# Patient Record
Sex: FEMALE | Race: WHITE | Employment: OTHER | URBAN - METROPOLITAN AREA
[De-identification: names, ages, dates, MRNs, and addresses within clinical notes are randomized per-mention and may not be internally consistent; named-entity substitution may affect disease eponyms.]

---

## 2020-08-07 ENCOUNTER — TRANSFERRED RECORDS (OUTPATIENT)
Dept: HEALTH INFORMATION MANAGEMENT | Facility: CLINIC | Age: 65
End: 2020-08-07

## 2020-08-27 ENCOUNTER — TRANSFERRED RECORDS (OUTPATIENT)
Dept: HEALTH INFORMATION MANAGEMENT | Facility: CLINIC | Age: 65
End: 2020-08-27

## 2021-02-22 ENCOUNTER — TRANSCRIBE ORDERS (OUTPATIENT)
Dept: OTHER | Age: 66
End: 2021-02-22

## 2021-02-22 DIAGNOSIS — H47.20 OPTIC ATROPHY: Primary | ICD-10-CM

## 2021-03-04 ENCOUNTER — TELEPHONE (OUTPATIENT)
Dept: OPHTHALMOLOGY | Facility: CLINIC | Age: 66
End: 2021-03-04

## 2021-03-04 NOTE — TELEPHONE ENCOUNTER
Tried to obtain patient MRI images that was completed by Sofya Duncan on 8/27/20. [facility] medical records stated that they could not push images to PACS without patient signing an EULOGIO.     Left patient a VM informing her of this and if she would be able to sign an EULOGIO so we could obtain these images.     ELIER ANDRADE, COT on 3/4/2021 at 3:04 PM

## 2021-04-07 ENCOUNTER — OFFICE VISIT (OUTPATIENT)
Dept: OPHTHALMOLOGY | Facility: CLINIC | Age: 66
End: 2021-04-07
Attending: OPHTHALMOLOGY
Payer: MEDICARE

## 2021-04-07 DIAGNOSIS — H53.10 SUBJECTIVE VISUAL DISTURBANCE: Primary | ICD-10-CM

## 2021-04-07 DIAGNOSIS — H53.40 VISUAL FIELD DEFECT: ICD-10-CM

## 2021-04-07 DIAGNOSIS — H47.20 OPTIC ATROPHY: ICD-10-CM

## 2021-04-07 PROCEDURE — 92133 CPTRZD OPH DX IMG PST SGM ON: CPT | Performed by: OPHTHALMOLOGY

## 2021-04-07 PROCEDURE — 99204 OFFICE O/P NEW MOD 45 MIN: CPT | Mod: GC | Performed by: OPHTHALMOLOGY

## 2021-04-07 PROCEDURE — 92083 EXTENDED VISUAL FIELD XM: CPT | Performed by: OPHTHALMOLOGY

## 2021-04-07 PROCEDURE — G0463 HOSPITAL OUTPT CLINIC VISIT: HCPCS

## 2021-04-07 RX ORDER — ACETAMINOPHEN 325 MG/1
TABLET, COATED ORAL
COMMUNITY
Start: 2021-01-06

## 2021-04-07 RX ORDER — OMEPRAZOLE 40 MG/1
CAPSULE, DELAYED RELEASE ORAL
COMMUNITY
Start: 2021-02-08

## 2021-04-07 RX ORDER — FLUOXETINE 40 MG/1
CAPSULE ORAL
COMMUNITY
Start: 2021-01-22

## 2021-04-07 RX ORDER — ALBUTEROL SULFATE 90 UG/1
AEROSOL, METERED RESPIRATORY (INHALATION)
COMMUNITY
Start: 2021-02-24

## 2021-04-07 RX ORDER — GABAPENTIN 600 MG/1
TABLET ORAL
COMMUNITY
Start: 2021-03-17

## 2021-04-07 RX ORDER — TRIAMCINOLONE ACETONIDE 1 MG/G
CREAM TOPICAL
COMMUNITY
Start: 2021-02-23

## 2021-04-07 ASSESSMENT — VISUAL ACUITY
METHOD: SNELLEN - LINEAR
OS_SC: 20/70
OD_PH_SC+: -2
OD_SC+: +2
OS_SC+: -2
OD_SC: 20/70
OD_PH_SC: 20/60

## 2021-04-07 ASSESSMENT — EXTERNAL EXAM - LEFT EYE: OS_EXAM: NORMAL

## 2021-04-07 ASSESSMENT — CUP TO DISC RATIO
OS_RATIO: 0.2
OD_RATIO: 0.2

## 2021-04-07 ASSESSMENT — TONOMETRY
OD_IOP_MMHG: 15
IOP_METHOD: ICARE
OS_IOP_MMHG: 13

## 2021-04-07 ASSESSMENT — CONF VISUAL FIELD
OS_NORMAL: 1
OD_NORMAL: 1

## 2021-04-07 ASSESSMENT — SLIT LAMP EXAM - LIDS
COMMENTS: NORMAL
COMMENTS: NORMAL

## 2021-04-07 ASSESSMENT — EXTERNAL EXAM - RIGHT EYE: OD_EXAM: NORMAL

## 2021-04-07 NOTE — PROGRESS NOTES
1. 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  It seems based upon her account that her vision acutely worsened during an episode of sepsis in July 2019 at which time she was even more calorie and nutrition restricted because she was unable to eat or drink for 14 days.  It is difficult to know at this point whether we are seeing damage to the optic nerves that already occurred and is not progressive or whether she continues to have progressive optic atrophy.  It is reassuring that her vision today tested better than it has in the past at Dr. Robles office (today 20/70 in both eyes without correction).  Her sense is that her vision has been stable but poor now for many months.  In a sense whether or not her combined toxic and nutritional optic neuropathy is currently active and progressive is an academic point and does not affect the plan.      I strongly urged her to stop smoking (which by itself can cause a nearly identical optic neuropathy but is made worse with underlying nutritional deficits) and urged her to improve her diet as well as initiate treatment with a multivitamin daily as well as B complex twice a day (because there are other vitamins / metals important for mitochondrial health of the optic nerve beyond B-12 such as folate, copper, thiamine).  I am not sure whether my advice registered with her however.  I also wanted her to get her B12 level rechecked because she has only been orally replaced.  If she did have for instance underlying stomach atrophy / pernicious anemia then she may require injections because of poor absorption.    Given the patient's clinical context and the pattern of her vision loss I do not feel that any additional work-up beyond what has already been performed is necessary.  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  Her peripheral sensory neuropathy is absolutely also  related to her nutritional status and is commonly seen in patients who are B12 and/or folate deficient and active smokers.    Plan:  Recheck B-12 level and folate on current treatment   Start additional Multi-vitamin daily and Bcomplex twice a day  STOP SMOKING, STOP SMOKING, STOP SMOKING    Typically I would see this patient back however due to the distance that she travels that is not logistically feasible.  She will follow up with her home ophthalmologist and neurologist and primary care physician instead.  I would always be happy to see her back in the future if there is any new concern.  In my mind the diagnosis in this case is very clear and the treatment is the most challenging aspect because it requires her to institute major lifestyle changes.    Patient was sent for consultation by Dr. Robles.    HPI:    She noted gradual blurred vision over multiple years that was not improving with corrective lenses, she's not sure of the exact timeline. More substantial decline when she was hospitalized for sepsis in July 2019.     On July 28, 2019, sepsis with gastric abscess/ulcer burst per patient recollection. She could not eat or drink for ~ 14 days and resulted in substantial weight loss. She noted a significant decline in her vision at that time and persisted in severity. Her cataract surgery in 2020 removed the fog but didn't improve her vision overall. Currently she needs to use a magnifying glass to see. Color vision affected, but able to see differences in black and white colors. Vision worse in dark light.     Fog lifted after cataract surgery in 2020 but visual loss persistent loss.   She feels like vision loss in the right eye is slightly better since cataract extraction and left eye about the same.  Not feeling like vision still actively worsening.    She takes vitamin B12, D, hair teeth and nail vitamin. Now eating better with meals made by [employer] but not eating many green vegetables. Regularly eating  bananas, cheese and meats.  Medications are managed nurse due to difficulty with dexterity in her fingers.     History ulcerative colitis with ongoing flares with difficult tolerating coffee/acids and diarrhea that will last 2-3 days and gradually improves. Occurrence 2 x month and can cause fecal incontinence.    Progressive weight loss, normal weight ~145-155 and currently 139 lbs but only a few pounds.     Follows with a [facility] neurologist and Dr. Cardozo for primary care. Painful peripheral neuropathy causing imbalance with tingling extending above her knees and involves the tips of fingers. Neuropathy came on gradually affecting feet and legs before fingers.     The patient is presenting with a chronic illness with severe exacerbation or progression.    Patient currently takes following vitamins:  Hair, skin, nails  B-12 chewables  Vitamin D    Patient does NOT take multivitamin nor Bcomplex.    Review of outside testing:    MRI brain imaging 8/7/2020 mri brain and orbits with and without contrast - nonspecific white matter disease but no apparent etiology for vision changes.     Labs ( CBC 12/2018) MCV = 112.3.  Normal H/H.  My interpretation performed today of outside testing:    I reviewed these MRI images today and agree with the interpretation.  There was no structural cause for optic atrophy which is compatible with my diagnosis of nutritional optic neuropathy.    Review of outside clinical notes:  From Dr. Robles 8/2020, notable 20/300 right eye not correctable pinhole and 20/250 in left eye, NI with pinhole.      Past medical history:  Ulcerative colitis dx age 18 not on active treatment, asthma, osteoarthritis, depression and anxiety, incontinence, peripheral neuropathy (gabapentin), Gastric abscess/ulcer s/p laparotomy 2019, cataract extraction 6/2020     Family history / social history:  No family history of ocular abnormality   Daughter with Crohn's who committed suicide at age 34   Significant  alcohol from 20 to 40s mainly beer a six pack on Friday, Saturday and Sunday. Not much alcohol now, occasional. Everyday smoker at most 1.5 ppd and currently < 1/2 ppd, she rolls her own.   Not driving due to vision loss.     Exam:  Visual acuity is 20/70 in each eye and improves to 20/60 with pinhole in the right eye, no improvement with pinhole in the left eye. Intraocular pressure is 15 in the right eye and 13 in the left eye. Pupils are briskly reactive and without an afferent pupillary defect. Confrontational visual fields are full. Color plates are reduce at 3/11 in the right eye and 1/11 in the left eye. Slit lamp exam is notable for bilateral posterior chamber intraocular lenses in good position with trace posterior capsular opacification in the right eye and 1+ in the left eye. Dilated fundus exam reveals moderate temporal pallor in both eyes.     Tests ordered and interpreted today:  Octopus automated 30 degree visual field showed a pattern of global depression in both eyes.  There was good reliability parameters in the left eye and in the right eye showed false positive errors high at 33%.    OCT of the optic nerve head revealed moderate retinal nerve fiber layer thinning temporally with relative sparing elsewhere    Neurologist:  Sebastián Oconnell MD  Primary care physician: Dr. Cardozo       50 minutes were spent on the date of the encounter by me doing chart review, history and exam, documentation, and further activities as noted above    Complete documentation of historical and exam elements from today's encounter can be found in the full encounter summary report (not reduplicated in this progress note).  I personally obtained the chief complaint(s) and history of present illness.  I confirmed and edited as necessary the review of systems, past medical/surgical history, family history, social history, and examination findings as documented by others; and I examined the patient myself.  I personally  reviewed the relevant tests, images, and reports as documented above.  I formulated and edited as necessary the assessment and plan and discussed the findings and management plan with the patient and family.  I personally reviewed the ophthalmic test(s) associated with this encounter, agree with the interpretation(s) as documented by the resident/fellow, and have edited the corresponding report(s) as necessary.     MD Lilli Hewitt MD  Neurology resident

## 2021-04-07 NOTE — LETTER
2021    RE: Itzel Pinto  : 1955  MRN: [id number]    Dear Dr. Robles,    Thank you for referring your patient, Itzel Pinto, to my neuro-ophthalmology clinic recently.  After a thorough neuro-ophthalmic history and examination, I came to the following conclusions:     1. 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  It seems based upon her account that her vision acutely worsened during an episode of sepsis in 2019 at which time she was even more calorie and nutrition restricted because she was unable to eat or drink for 14 days.  It is difficult to know at this point whether we are seeing damage to the optic nerves that already occurred and is not progressive or whether she continues to have progressive optic atrophy.  It is reassuring that her vision today tested better than it has in the past at Dr. Robles office (today 20/70 in both eyes without correction).  Her sense is that her vision has been stable but poor now for many months.  In a sense whether or not her combined toxic and nutritional optic neuropathy is currently active and progressive is an academic point and does not affect the plan.      I strongly urged her to stop smoking (which by itself can cause a nearly identical optic neuropathy but is made worse with underlying nutritional deficits) and urged her to improve her diet as well as initiate treatment with a multivitamin daily as well as B complex twice a day (because there are other vitamins / metals important for mitochondrial health of the optic nerve beyond B-12 such as folate, copper, thiamine).  I am not sure whether my advice registered with her however.  I also wanted her to get her B12 level rechecked because she has only been orally replaced.  If she did have for instance underlying stomach atrophy / pernicious anemia then she may require injections because of poor  absorption.    Given the patient's clinical context and the pattern of her vision loss I do not feel that any additional work-up beyond what has already been performed is necessary.  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  Her peripheral sensory neuropathy is absolutely also related to her nutritional status and is commonly seen in patients who are B12 and/or folate deficient and active smokers.    Plan:  Recheck B-12 level and folate on current treatment   Start additional Multi-vitamin daily and Bcomplex twice a day  STOP SMOKING, STOP SMOKING, STOP SMOKING    Typically I would see this patient back however due to the distance that she travels that is not logistically feasible.  She will follow up with her home ophthalmologist and neurologist and primary care physician instead.  I would always be happy to see her back in the future if there is any new concern.  In my mind the diagnosis in this case is very clear and the treatment is the most challenging aspect because it requires her to institute major lifestyle changes.    Patient was sent for consultation by Dr. Robles.    HPI:    She noted gradual blurred vision over multiple years that was not improving with corrective lenses, she's not sure of the exact timeline. More substantial decline when she was hospitalized for sepsis in July 2019.     On July 28, 2019, sepsis with gastric abscess/ulcer burst per patient recollection. She could not eat or drink for ~ 14 days and resulted in substantial weight loss. She noted a significant decline in her vision at that time and persisted in severity. Her cataract surgery in 2020 removed the fog but didn't improve her vision overall. Currently she needs to use a magnifying glass to see. Color vision affected, but able to see differences in black and white colors. Vision worse in dark light.     Fog lifted after cataract surgery in 2020 but visual loss persistent loss.   She feels like  vision loss in the right eye is slightly better since cataract extraction and left eye about the same.  Not feeling like vision still actively worsening.    She takes vitamin B12, D, hair teeth and nail vitamin. Now eating better with meals made by [employer] but not eating many green vegetables. Regularly eating bananas, cheese and meats.  Medications are managed nurse due to difficulty with dexterity in her fingers.     History ulcerative colitis with ongoing flares with difficult tolerating coffee/acids and diarrhea that will last 2-3 days and gradually improves. Occurrence 2 x month and can cause fecal incontinence.    Progressive weight loss, normal weight ~145-155 and currently 139 lbs but only a few pounds.     Follows with a [facility] neurologist and Dr. Cardozo for primary care. Painful peripheral neuropathy causing imbalance with tingling extending above her knees and involves the tips of fingers. Neuropathy came on gradually affecting feet and legs before fingers.     The patient is presenting with a chronic illness with severe exacerbation or progression.    Patient currently takes following vitamins:  Hair, skin, nails  B-12 chewables  Vitamin D    Patient does NOT take multivitamin nor Bcomplex.    Review of outside testing:    MRI brain imaging 8/7/2020 mri brain and orbits with and without contrast - nonspecific white matter disease but no apparent etiology for vision changes.     Labs ( CBC 12/2018) MCV = 112.3.  Normal H/H.  My interpretation performed today of outside testing:    I reviewed these MRI images today and agree with the interpretation.  There was no structural cause for optic atrophy which is compatible with my diagnosis of nutritional optic neuropathy.    Review of outside clinical notes:  From Dr. Robles 8/2020, notable 20/300 right eye not correctable pinhole and 20/250 in left eye, NI with pinhole.      Past medical history:  Ulcerative colitis dx age 18 not on active treatment, asthma,  osteoarthritis, depression and anxiety, incontinence, peripheral neuropathy (gabapentin), Gastric abscess/ulcer s/p laparotomy 2019, cataract extraction 6/2020     Family history / social history:  No family history of ocular abnormality   Daughter with Crohn's who committed suicide at age 34   Significant alcohol from 20 to 40s mainly beer a six pack on Friday, Saturday and Sunday. Not much alcohol now, occasional. Everyday smoker at most 1.5 ppd and currently < 1/2 ppd, she rolls her own.   Not driving due to vision loss.     Exam:  Visual acuity is 20/70 in each eye and improves to 20/60 with pinhole in the right eye, no improvement with pinhole in the left eye. Intraocular pressure is 15 in the right eye and 13 in the left eye. Pupils are briskly reactive and without an afferent pupillary defect. Confrontational visual fields are full. Color plates are reduce at 3/11 in the right eye and 1/11 in the left eye. Slit lamp exam is notable for bilateral posterior chamber intraocular lenses in good position with trace posterior capsular opacification in the right eye and 1+ in the left eye. Dilated fundus exam reveals moderate temporal pallor in both eyes.     Tests ordered and interpreted today:  Octopus automated 30 degree visual field showed a pattern of global depression in both eyes.  There was good reliability parameters in the left eye and in the right eye showed false positive errors high at 33%.    OCT of the optic nerve head revealed moderate retinal nerve fiber layer thinning temporally with relative sparing elsewhere    Neurologist:  Sebastián Oconnell MD  Primary care physician: Dr. Cardozo    Again, thank you for trusting me with the care of your patient.  For further exam details, please feel free to contact our office for additional records.  If you wish to contact me regarding this patient please email me at [email or web address] or give my clinic a call to arrange a phone conversation.    Sincerely,    Parviz  MD Hilario  , Neuro-Ophthalmology and Adult Strabismus Surgery  The Michelle ORDOÑEZ and Radha Francisco Chair in Neuro-Ophthalmology  Department of Ophthalmology and Visual Neurosciences  [facility]    DX: nutritional optic neuropathy, optic atrophy